# Patient Record
Sex: MALE | Race: WHITE
[De-identification: names, ages, dates, MRNs, and addresses within clinical notes are randomized per-mention and may not be internally consistent; named-entity substitution may affect disease eponyms.]

---

## 2018-01-11 ENCOUNTER — HOSPITAL ENCOUNTER (INPATIENT)
Dept: HOSPITAL 92 - ERS | Age: 78
LOS: 2 days | Discharge: HOME | DRG: 309 | End: 2018-01-13
Attending: INTERNAL MEDICINE | Admitting: INTERNAL MEDICINE
Payer: MEDICARE

## 2018-01-11 VITALS — BODY MASS INDEX: 25.9 KG/M2

## 2018-01-11 DIAGNOSIS — Z79.82: ICD-10-CM

## 2018-01-11 DIAGNOSIS — I48.2: Primary | ICD-10-CM

## 2018-01-11 DIAGNOSIS — I10: ICD-10-CM

## 2018-01-11 DIAGNOSIS — Z79.84: ICD-10-CM

## 2018-01-11 DIAGNOSIS — E11.9: ICD-10-CM

## 2018-01-11 DIAGNOSIS — Z95.1: ICD-10-CM

## 2018-01-11 DIAGNOSIS — Z87.891: ICD-10-CM

## 2018-01-11 DIAGNOSIS — N40.0: ICD-10-CM

## 2018-01-11 DIAGNOSIS — I24.8: ICD-10-CM

## 2018-01-11 LAB
ALBUMIN SERPL BCG-MCNC: 3.6 G/DL (ref 3.4–4.8)
ALP SERPL-CCNC: 72 U/L (ref 40–150)
ALT SERPL W P-5'-P-CCNC: 9 U/L (ref 8–55)
ANION GAP SERPL CALC-SCNC: 17 MMOL/L (ref 10–20)
AST SERPL-CCNC: 12 U/L (ref 5–34)
BASOPHILS # BLD AUTO: 0 THOU/UL (ref 0–0.2)
BASOPHILS NFR BLD AUTO: 0.2 % (ref 0–1)
BILIRUB SERPL-MCNC: 0.6 MG/DL (ref 0.2–1.2)
BUN SERPL-MCNC: 16 MG/DL (ref 8.4–25.7)
CALCIUM SERPL-MCNC: 9.5 MG/DL (ref 7.8–10.44)
CHLORIDE SERPL-SCNC: 104 MMOL/L (ref 98–107)
CK MB SERPL-MCNC: 1.4 NG/ML (ref 0–6.6)
CK SERPL-CCNC: 27 U/L (ref 30–200)
CO2 SERPL-SCNC: 21 MMOL/L (ref 23–31)
CREAT CL PREDICTED SERPL C-G-VRATE: 0 ML/MIN (ref 70–130)
EOSINOPHIL # BLD AUTO: 0.3 THOU/UL (ref 0–0.7)
EOSINOPHIL NFR BLD AUTO: 2.5 % (ref 0–10)
GLOBULIN SER CALC-MCNC: 3.2 G/DL (ref 2.4–3.5)
GLUCOSE SERPL-MCNC: 178 MG/DL (ref 83–110)
HGB BLD-MCNC: 14 G/DL (ref 14–18)
LYMPHOCYTES # BLD: 3.6 THOU/UL (ref 1.2–3.4)
LYMPHOCYTES NFR BLD AUTO: 33.1 % (ref 21–51)
MCH RBC QN AUTO: 29.8 PG (ref 27–31)
MCV RBC AUTO: 90.1 FL (ref 80–94)
MONOCYTES # BLD AUTO: 0.9 THOU/UL (ref 0.11–0.59)
MONOCYTES NFR BLD AUTO: 7.8 % (ref 0–10)
NEUTROPHILS # BLD AUTO: 6.2 THOU/UL (ref 1.4–6.5)
NEUTROPHILS NFR BLD AUTO: 56.4 % (ref 42–75)
PLATELET # BLD AUTO: 212 THOU/UL (ref 130–400)
POTASSIUM SERPL-SCNC: 4.3 MMOL/L (ref 3.5–5.1)
RBC # BLD AUTO: 4.69 MILL/UL (ref 4.7–6.1)
SODIUM SERPL-SCNC: 138 MMOL/L (ref 136–145)
TROPONIN I SERPL DL<=0.01 NG/ML-MCNC: 0.04 NG/ML (ref ?–0.03)
TROPONIN I SERPL DL<=0.01 NG/ML-MCNC: 0.06 NG/ML (ref ?–0.03)
WBC # BLD AUTO: 10.9 THOU/UL (ref 4.8–10.8)

## 2018-01-11 PROCEDURE — 93010 ELECTROCARDIOGRAM REPORT: CPT

## 2018-01-11 PROCEDURE — 36416 COLLJ CAPILLARY BLOOD SPEC: CPT

## 2018-01-11 PROCEDURE — 93005 ELECTROCARDIOGRAM TRACING: CPT

## 2018-01-11 PROCEDURE — 96361 HYDRATE IV INFUSION ADD-ON: CPT

## 2018-01-11 PROCEDURE — 84484 ASSAY OF TROPONIN QUANT: CPT

## 2018-01-11 PROCEDURE — 93306 TTE W/DOPPLER COMPLETE: CPT

## 2018-01-11 PROCEDURE — 84443 ASSAY THYROID STIM HORMONE: CPT

## 2018-01-11 PROCEDURE — 82553 CREATINE MB FRACTION: CPT

## 2018-01-11 PROCEDURE — 80048 BASIC METABOLIC PNL TOTAL CA: CPT

## 2018-01-11 PROCEDURE — 94760 N-INVAS EAR/PLS OXIMETRY 1: CPT

## 2018-01-11 PROCEDURE — 85025 COMPLETE CBC W/AUTO DIFF WBC: CPT

## 2018-01-11 PROCEDURE — 93880 EXTRACRANIAL BILAT STUDY: CPT

## 2018-01-11 PROCEDURE — 80053 COMPREHEN METABOLIC PANEL: CPT

## 2018-01-11 PROCEDURE — 85379 FIBRIN DEGRADATION QUANT: CPT

## 2018-01-11 PROCEDURE — 96360 HYDRATION IV INFUSION INIT: CPT

## 2018-01-11 PROCEDURE — 80061 LIPID PANEL: CPT

## 2018-01-11 PROCEDURE — 71275 CT ANGIOGRAPHY CHEST: CPT

## 2018-01-11 PROCEDURE — 36415 COLL VENOUS BLD VENIPUNCTURE: CPT

## 2018-01-11 PROCEDURE — 83880 ASSAY OF NATRIURETIC PEPTIDE: CPT

## 2018-01-11 PROCEDURE — A4216 STERILE WATER/SALINE, 10 ML: HCPCS

## 2018-01-11 NOTE — CT
CT PULMONARY ANGIOGRAM WITH IV CONTRAST AND 3D POSTPROCESSING

1/11/18

 

HISTORY: 

Chest pain. Atrial fibrillation. 

 

FINDINGS:  

There is good contrast opacification of the pulmonary arterial vasculature without filling defects to
 suggest pulmonary embolism. Postop changes or seen. No pleural or pericardial effusions are identifi
ed. There are vascular calcifications without evidence of aneurysmal dilatation of the thoracic aorta
. Chronic changes seen in the lung fields bilaterally. No lobar consolidation or pneumothoraces are n
oted. There is a 7 mm nodule in the anterior aspect of the right upper lobe. There are degenerative c
hanges in the spine. Upper abdominal tomograms demonstrates fatty infiltration of the pancreas. A 7 m
m nodule is also seen in the lingula. 

 

IMPRESSION:  

1.      No CT evidence of pulmonary embolism.

2.      Indeterminate parenchymal lung nodules. A followup CT scan of the chest is recommended in thr
ee months.

 

Code LN

 

POS: MARLENE

## 2018-01-12 LAB — TROPONIN I SERPL DL<=0.01 NG/ML-MCNC: 0.04 NG/ML (ref ?–0.03)

## 2018-01-12 RX ADMIN — INSULIN LISPRO PRN UNITS: 100 INJECTION, SOLUTION INTRAVENOUS; SUBCUTANEOUS at 17:08

## 2018-01-12 NOTE — CON-2
DATE OF CONSULTATION:  01/12/2018

 

ATTENDING:  Len Pa M.D.

 

RESIDENT:  Silvia Breen M.D., PGY-2.

 

REASON FOR CONSULTATION:  Atrial fibrillation with rapid ventricular rate.

 

HISTORY OF PRESENT ILLNESS:  The patient is a 77-year-old  male with a past history of atria
l fibrillation 2 years status post ablation and coronary artery disease, status post 3 vessel CABG wh
o presented to the Emergency Department with a chief complaint of left arm numbness and tingling.  He
 was incidentally noted to have pulse in the 140s.  After transfer to [facility], he was given asp
irin 324 mg and 1 liter of normal saline and spontaneously his rate became controlled without medicat
ions.  Earlier this morning, he was noted to have heart rate in the 130s.  He was given a bolus of di
ltiazem on the floor and subsequently started on a diltiazem drip.  At this moment, his heart rate is
 currently controlled in the 90s.  Of note, the patient had an ablation two years ago in [place].  Telma
or to this, he was on anticoagulation with warfarin.  He had a fall resulting in intracranial hemorrh
age.  Because of this, the decision was made to for patient to undergo ablation with placement of Carlos
chman device and warfarin was discontinued indefinitely shortly after that.

 

PAST MEDICAL HISTORY:

1.  Coronary artery disease.

2.  Atrial fibrillation, status post ablation.

3.  Diabetes mellitus type 2.

4.  Congestive heart failure.

 

PAST SURGICAL HISTORY:

1.  CABG x3 vessels.

2.  Left eye cataract surgery.

 

ALLERGIES:  No known drug allergies.

 

MEDICATIONS:

1.  Aspirin 81 mg.

2.  Tamsulosin 0.4 mg p.o. daily.

3.  Glipizide 10 mg p.o. daily.

4.  Metformin 1000 mg p.o. b.i.d. with meals.

5.  Carvedilol 3.125 mg p.o. b.i.d.

6.  Claritin 10 mg p.o. daily.

 

SOCIAL HISTORY:  The patient is a former tobacco user.  He states that he smoked 5 packs of cigarette
s per day for approximately 30 years.  He quit in 1988.  He denies alcohol and drug use.

 

REVIEW OF SYSTEMS:  Positive symptoms include left arm paresthesias.  Ten point review of systems oth
erwise negative.

 

PHYSICAL EXAMINATION:

VITAL SIGNS:  Temperature 97.2, pulse 93, respirations 18, pulse ox 95% on room air, blood pressure 1
44/62.

GENERAL:  The patient is alert and oriented x3, no apparent distress.  He is appropriately interactiv
e.

EYES:  Pupils are equal, round, reactive to light.  Extraocular muscles intact.

NECK:  Supple.  No lymphadenopathy.

CARDIOVASCULAR:  Irregularly irregular rate, no murmurs or gallops.  Radial and pedal pulses 2+.

RESPIRATORY:  Lungs are clear to auscultation bilaterally.  The patient is breathing with normal effo
rt.

SKIN:  Warm and dry with no cyanosis or lesions.

ABDOMEN:  Soft and nontender to palpation.

EXTREMITIES:  No clubbing, cyanosis or edema.

MUSCULOSKELETAL:  Structure and tone within normal limits.

NEUROLOGIC:  Cranial nerves II-XII intact grossly.  No focal deficits.

 

LABORATORY DATA:

1.  CBC:  White blood cell count 10.9, hemoglobin 14.0, hematocrit 42.3, platelet count 212.

2.  D-dimer 0.58.

3.  CMP:  Sodium 138, potassium 4.3, chloride 104, carbon dioxide 21, BUN 16, creatinine 1.29, glucos
e 178, calcium 9.5, total bilirubin 0.6, AST 12, ALT 9, alkaline phosphatase 72.

4.  Cardiac enzymes:  CK 27, CK-MB 1.4, troponin 0.039, 0.057, 0.039.

5.  .

6.  TSH 3.8.

7.  CTA of the chest negative for pulmonary embolism.

 

ASSESSMENT AND PLAN:  The patient is a 77-year-old male with past history of CAD and atrial fibrillat
ion, status post ablation.  The patient's heart rate is currently controlled.  Diltiazem drip is curr
ently at 5 and patient's home carvedilol has been increased from 3.125 to 6.25 twice daily.  We will 
start patient on p.o. diltiazem and titrate diltiazem drip down as tolerated.  Given that patient has
 an atrial appendage occlusive device, we will continue him on aspirin alone.  We will request record
s from [place] regarding patient's ablation procedure.

## 2018-01-12 NOTE — HP
REASON FOR ADMISSION:  Atrial fibrillation with rapid ventricular response, 
demand ischemia, left upper extremity paraesthesias.

 

HISTORY OF PRESENT ILLNESS:  The patient complained of left upper extremity 
numbness at home.  His wife checked his blood pressure to be 108/60 and his 
heart rate was very fast.  The machine could not pick it up.  They managed to 
go to [facility] ER.  He was found to be in atrial 
fibrillation with RVR at 137 beats per minute.  The patient was given Cardizem 
IV push and was transferred here for higher level of care.  On arrival, the 
patient's heart rate was 93, then went up to 140s at around 12:49 midnight and 
was given an additional 10 mg IV push of Cardizem.  Currently, his heart rate 
is in the 130s, but the patient does not complain of any palpitations.  His 
left upper extremity numbness is resolving.  No chest pain as such.  The 
patient gives history of chronic atrial fibrillation and has had prior ablation 
done 2 years ago at [facility] in [place].  He was on Coumadin before 
for atrial fibrillation, but had a syncopal episode with subdural hematoma on  and was taken off Coumadin from then on.  He does not follow with any 
cardiologist at present.  He used to follow up with Dr. Borges at [place], but 
the physician moved off and since then he has not followed up with one.  

 

PAST MEDICAL/SURGICAL HISTORY:  CABG for 3-vessel disease in  done at 
[place], [place], prior history of atrial fibrillation with ablation done 2 years 
back at [facility] in [place].  He has had cataract surgery on the left side 
done on Monday, diabetes mellitus type 2, prior history of subdural hematoma in 
2015, prior colonoscopies with no malignant lesions, benign prostatic 
hypertrophy.

 

CURRENT MEDICATIONS:  Metformin 1 gram twice daily, glipizide 10 mg daily, 
Coreg 3.125 mg twice daily, aspirin 81 mg daily, Flomax at bedtime.

 

ALLERGIES:  No known drug allergies.

 

PERSONAL HISTORY:  Currently does not smoke.  Quit smoking in , prior to 
which he had smoked 5 packs a day for nearly 30 years.  Does not abuse alcohol 
or drugs.  Lives with his wife.  On a normal day the patient ambulates within 
the house, but cannot walk beyond that due to lower extremity pain and likely 
peripheral vascular disease.

 

FAMILY HISTORY:  Mom  of breast cancer at the age of 58 years.  Father  
in his 70s in his sleep, he had a history of diabetes as well.

 

REVIEW OF SYSTEMS:  The following complete review of systems was

negative, unless otherwise mentioned in the HPI or below:

Constitutional:  Weight loss or gain, ability to conduct usual

activities.

Skin:  Rash, itching.

Eyes:  Double vision, pain.

ENT/Mouth:  Nose bleeding, neck stiffness, pain, tenderness.

Cardiovascular:  Palpitations, dyspnea on exertion, orthopnea.

Respiratory:  Shortness of breath, wheezing, cough, hemoptysis, 

fever or night sweats.

Gastrointestinal:  Poor appetite, abdominal pain, heartburn, 

nausea, vomiting, constipation, or diarrhea.

Genitourinary:  Urgency, frequency, dysuria, nocturia.

Musculoskeletal:  Pain, swelling.

Neurologic/Psychiatric:  Anxiety, depression.

Allergy/Immunologic:  Skin rash, bleeding tendency.

 

PHYSICAL EXAMINATION:

GENERAL:  The patient is a 77-year-old male who is currently not in any acute 
distress.

VITAL SIGNS:  Blood pressure 126/78, pulse 130 per minute, currently, 
respiratory rate 18 per minute, temperature 98 degrees Fahrenheit, saturating 98
% on room air.

NECK:  Supple, no elevated JVD.

HEENT:  Eyes; extraocular muscles intact.  Pupils reacting to light.  Oral 
cavity; mucous membranes are moist.  No exudates or congestion.

CARDIOVASCULAR:  S1, S2 heard.  Irregular rhythm, no murmur.

RESPIRATORY:  Air entry 1+ bilaterally.  No rales or rhonchi.

ABDOMEN:  Soft, bowel sounds heard.  No tenderness, rigidity or guarding.

EXTREMITIES:  No peripheral edema or calf tenderness.

VASCULAR:  Peripheral pulses are 1+ bilateral.  No ischemic ulcerations or 
gangrene.

CENTRAL NERVOUS SYSTEM:  No gross focal deficits seen.  Patient is alert, awake
, oriented well.

PSYCHIATRIC:  The patient's mood is euthymic.  No hallucinations or delusions.

 

LABORATORY AND X-RAY FINDINGS:  Initial EKG done at [facility] ER 
showed atrial fibrillation with RVR at 137 beats per minute.  This was at 5:41 
p.m. yesterday evening.  On arrival here, the patient had atrial fibrillation 
with RVR at 93 beats per minute at 10:09 p.m. EKG also shows Q-waves in lead II
, III, AVF.  White count 10, H&H 14 and 42, platelet count 212, MCV is 90 with 
56% neutrophils.  D-dimer is 0.58.  Serum bicarbonate 21, BUN 16, creatinine 1.2
, serum glucose 178.  Liver enzymes within normal limits.  CK-MB 1.4, troponin 
I is indeterminate peaking up to 0.05.  BNP is 431, albumin is 3.6.  TSH 3.84.  
CT angio chest done shows no PE.  There are nonspecific pulmonary lung nodules 
seen in the right upper lobe.

 

CLINICAL IMPRESSION AND PLAN:  The patient will be admitted to telemetry for 
atrial fibrillation with rapid ventricular response with demand ischemia.  He 
will be placed on Cardizem drip for now at 5 mg an hour.  Full dose aspirin.  
No anticoagulation in view of prior history of subdural hematoma with syncope.  
Dr. Pa who is on call for Cardiology will be consulted.  He will have 
echo with 2D  Doppler, a free T3, free T4 and a repeat TSH will be obtained in 
the morning.  The patient has not had a prior ultrasound of the carotids.  We 
will obtain the same in view of his history of subdural hematoma with sudden 
syncopal episode 2 years back which led to him being taken off Coumadin then.  
We will increase his Coreg to 6.25 mg twice daily.  Metformin will be held for 
now.  We will continue him on glipizide and moderate Humalog coverage for now.  
We will also continue his Flomax as before.

 

MTDD

## 2018-01-12 NOTE — CON
DATE OF CONSULTATION:  01/12/2018

 

Refer to Silvia Breen for H&P for details.

 

Mr. Dietz recently presented with atrial fibrillation with RVR.  He has a previous history of AFIB st
atus post ablation.  Based on his history, it appears he has also had a Watchman device.  He was on C
oumadin for 3 months and then was discontinued.  His wife states due to trauma with intracerebral ble
ed, it was decided to proceed with Watchman device.

 

He is currently stable on 5 mcg of diltiazem.

 

PHYSICAL EXAMINATION:

GENERAL:  Patient is a pleasant male who is in no acute distress.  The patient appears his stated age
.

VITAL SIGNS:  Blood pressure 144/62, pulse 103, temperature 97.2.

NEUROLOGIC:  The patient is alert and oriented times 3 with no focal neurologic deficits.

HEENT:  Sclerae without icterus.  Mouth has moist mucous membranes with normal pallor.

NECK:  No JVD.  Carotid upstroke brisk.  No bruits bilaterally.

LUNGS:  Clear to auscultation with unlabored respirations.

BACK:  No scoliosis or kyphosis.

CARDIAC:  Irregularly irregular.  No S3 or S4 noted.  No significant rubs, murmurs, thrills, or martinez
ps noted throughout the precordium.  PMI is not displaced.  There is no parasternal heave.

ABDOMEN:  Soft, nontender, nondistended.  No peritoneal signs present.

No hepatosplenomegaly.  No abnormal striae.

EXTREMITIES:  2+ femoral and 2+ dorsalis pedis pulses.  No cyanosis, clubbing, or edema.

SKIN:  No gross abnormalities.

 

PERTINENT LABORATORY DATA:  Hemoglobin 14, creatinine 1.29.

 

IMPRESSION:

1.  Atrial fibrillation with rapid ventricular response.

2.  Coronary artery disease.

3.  Status bypass surgery.

 

RECOMMENDATIONS:

1.  Add p.o. Cardizem at 180 mg 1 p.o. x1 dose now in addition to Coreg.

2.  Continue IV Cardizem and titrate down once his heart rate is controlled.

3.  We will try and obtain records to confirm Watchman device placement.  If that is the case, he osorio
s not need anticoagulation therapy.

## 2018-01-13 VITALS — SYSTOLIC BLOOD PRESSURE: 124 MMHG | DIASTOLIC BLOOD PRESSURE: 61 MMHG | TEMPERATURE: 98.4 F

## 2018-01-13 LAB
ANION GAP SERPL CALC-SCNC: 12 MMOL/L (ref 10–20)
BASOPHILS # BLD AUTO: 0.1 THOU/UL (ref 0–0.2)
BASOPHILS NFR BLD AUTO: 0.4 % (ref 0–1)
BUN SERPL-MCNC: 16 MG/DL (ref 8.4–25.7)
CALCIUM SERPL-MCNC: 9.4 MG/DL (ref 7.8–10.44)
CHD RISK SERPL-RTO: 3.7 (ref ?–4.5)
CHLORIDE SERPL-SCNC: 101 MMOL/L (ref 98–107)
CHOLEST SERPL-MCNC: 121 MG/DL
CO2 SERPL-SCNC: 24 MMOL/L (ref 23–31)
CREAT CL PREDICTED SERPL C-G-VRATE: 46 ML/MIN (ref 70–130)
EOSINOPHIL # BLD AUTO: 0.3 THOU/UL (ref 0–0.7)
EOSINOPHIL NFR BLD AUTO: 2.4 % (ref 0–10)
GLUCOSE SERPL-MCNC: 280 MG/DL (ref 83–110)
HDLC SERPL-MCNC: 33 MG/DL
HGB BLD-MCNC: 12.9 G/DL (ref 14–18)
LDLC SERPL CALC-MCNC: 59 MG/DL
LYMPHOCYTES # BLD: 3.2 THOU/UL (ref 1.2–3.4)
LYMPHOCYTES NFR BLD AUTO: 28.1 % (ref 21–51)
MCH RBC QN AUTO: 29.8 PG (ref 27–31)
MCV RBC AUTO: 90.7 FL (ref 80–94)
MONOCYTES # BLD AUTO: 0.8 THOU/UL (ref 0.11–0.59)
MONOCYTES NFR BLD AUTO: 7 % (ref 0–10)
NEUTROPHILS # BLD AUTO: 7 THOU/UL (ref 1.4–6.5)
NEUTROPHILS NFR BLD AUTO: 62 % (ref 42–75)
PLATELET # BLD AUTO: 205 THOU/UL (ref 130–400)
POTASSIUM SERPL-SCNC: 4.4 MMOL/L (ref 3.5–5.1)
RBC # BLD AUTO: 4.34 MILL/UL (ref 4.7–6.1)
SODIUM SERPL-SCNC: 133 MMOL/L (ref 136–145)
TRIGL SERPL-MCNC: 145 MG/DL (ref ?–150)
WBC # BLD AUTO: 11.3 THOU/UL (ref 4.8–10.8)

## 2018-01-13 RX ADMIN — INSULIN LISPRO PRN UNITS: 100 INJECTION, SOLUTION INTRAVENOUS; SUBCUTANEOUS at 12:05

## 2018-01-13 NOTE — PDOC.PN
- Subjective


Encounter Start Date: 01/13/18


Encounter Start Time: 08:40


Subjective: no chest pain or palp





- Objective


Resuscitation Status: 


 











Resuscitation Status           FULL:Full Resuscitation














MAR Reviewed: Yes


Vital Signs & Weight: 


 Vital Signs (12 hours)











  Temp Pulse Resp BP Pulse Ox


 


 01/13/18 08:30  98.1 F  71  16  126/60  94 L


 


 01/13/18 04:19      95


 


 01/13/18 04:10      96


 


 01/13/18 03:31  97.6 F  69  18  133/64  96


 


 01/13/18 00:05   70   128/58 L 








 Weight











Weight                         181 lb 8 oz














I&O: 


 











 01/12/18 01/13/18 01/14/18





 06:59 06:59 06:59


 


Intake Total 120 1806.6 


 


Output Total  200 


 


Balance 120 1606.6 











Result Diagrams: 


 01/13/18 04:53





 01/13/18 04:53


Additional Labs: 


 Accuchecks











  01/12/18 01/12/18 01/12/18





  20:35 17:00 11:14


 


POC Glucose  284 H  221 H  276 H














Phys Exam





- Physical Examination


HEENT: PERRLA, moist MMs


Neck: no JVD, supple


Respiratory: no wheezing, no rales


Cardiovascular: RRR, no significant murmur


Gastrointestinal: soft, non-tender, positive bowel sounds


Musculoskeletal: no edema, pulses present


Neurological: non-focal, moves all 4 limbs


Psychiatric: A&O x 3





Dx/Plan


(1) Afib


Code(s): I48.91 - UNSPECIFIED ATRIAL FIBRILLATION   Status: Acute   


Qualifiers: 


   Atrial fibrillation type: chronic   Qualified Code(s): I48.2 - Chronic 

atrial fibrillation   





(2) Demand ischemia of myocardium


Code(s): I24.8 - OTHER FORMS OF ACUTE ISCHEMIC HEART DISEASE   Status: Resolved

   





(3) HTN (hypertension)


Code(s): I10 - ESSENTIAL (PRIMARY) HYPERTENSION   Status: Chronic   


Qualifiers: 


   Hypertension type: essential hypertension   Qualified Code(s): I10 - 

Essential (primary) hypertension   





(4) DM type 2 (diabetes mellitus, type 2)


Status: Chronic   


Qualifiers: 


   Diabetes mellitus complication status: with unspecified complications   

Diabetes mellitus long term insulin use: without long term use   Qualified Code(

s): E11.8 - Type 2 diabetes mellitus with unspecified complications   





- Plan


afib in sinus rhythm this am


-: dc cardizem drip


-: is on oral coreg and start cardizem CD 180mg one dose now and daily


-: echo is done


-: dc plan in am, watch for bradycardia





* .








Review of Systems





- Medications/Allergies


Allergies/Adverse Reactions: 


 Allergies











Allergy/AdvReac Type Severity Reaction Status Date / Time


 


No Known Allergies Allergy   Unverified 01/12/18 01:07











Medications: 


 Current Medications





Acetaminophen (Tylenol)  650 mg PO Q4H PRN


   PRN Reason: Headache/Fever or Pain


Aspirin (Aspirin)  325 mg PO DAILY [facility]


   Last Admin: 01/13/18 08:32 Dose:  325 mg


Carvedilol (Coreg)  6.25 mg PO BID [facility]


   Last Admin: 01/13/18 08:33 Dose:  6.25 mg


Dextrose/Water (Dextrose 50%)  25 gm SLOW IVP PRN PRN


   PRN Reason: Hypoglycemia


Enoxaparin Sodium (Lovenox)  40 mg SC 0900 [facility]


   Last Admin: 01/13/18 08:33 Dose:  40 mg


Famotidine (Pepcid)  20 mg PO BID [facility]


   Last Admin: 01/13/18 08:33 Dose:  20 mg


Glipizide (Glucotrol)  10 mg PO 0730 [facility]


   Last Admin: 01/13/18 08:32 Dose:  10 mg


Glucagon (Glucagon)  1 mg IM PRN PRN


   PRN Reason: Hypoglycemia


Guaifenesin/Dextromethorphan (Robitussin Dm)  15 ml PO Q4H PRN


   PRN Reason: Cough


Dextrose/Water (D5w)  1,000 mls @ 0 mls/hr IV .Q0M PRN; As Directed


   PRN Reason: Hypoglycemia


Insulin Human Lispro (Humalog)  0 units SC .MODERATE SLIDING SC PRN


   PRN Reason: Moderate Correctional Scale


   Last Admin: 01/12/18 17:08 Dose:  4 units


Insulin Human Lispro (Humalog)  0 units SC .BEDTIME SLIDING SC PRN


   PRN Reason: Bedtime Correctional Scale


   Last Admin: 01/12/18 20:57 Dose:  3 unit


Metformin HCl (Glucophage)  1,000 mg PO BID-[facility]


Nitroglycerin (Nitrostat)  0.4 mg PO Q5MIN PRN


   PRN Reason: Chest Pain


Senna (Senokot)  2 tab PO HSPRN PRN


   PRN Reason: Constipation


Tamsulosin HCl (Flomax)  0.4 mg PO DAILY [facility]


   Last Admin: 01/13/18 08:33 Dose:  0.4 mg

## 2018-01-16 NOTE — DIS
DATE OF ADMISSION:  01/12/2018

 

DATE OF DISCHARGE:  01/13/2018

 

DISCHARGE DISPOSITION:  Home.

 

PRIMARY DISCHARGE DIAGNOSES:

1.  Atrial fibrillation with rapid ventricular response which is rate controlled.

2.  Demand ischemia secondary to above.

 

SECONDARY DISCHARGE DIAGNOSES:  Hypertension, diabetes mellitus type 2.

 

PROCEDURES DONE DURING HOSPITALIZATION:  The patient has had CT angio chest done on the day of admiss
ion, which showed no evidence of PE.  There were indeterminate parenchymal lung nodules which need a 
follow up CAT scan in 3 months.  Echo with 2D  Doppler showed an EF of 40-45%.  There was diastolic d
ysfunction.  Carotid Doppler done showed no hemodynamically significant stenosis.  H&H 12 and 39, agustina
telet count 205.  Discharge BUN and creatinine is 16 and 1.5, total cholesterol 121, triglycerides 14
5, LDL 59, HDL 33.  Troponin I was indeterminate peaking up to 0.05, CK-MB 1.4, BNP was 431.

 

DISCHARGE MEDICATIONS:  Aspirin 81 mg p.o. daily, Coreg 6.25 mg p.o. 3 times daily, glipizide 10 mg p
.o. daily, Loratadine 10 mg daily, metformin 1000 mg twice daily, Flomax 0.4 mg p.o. daily.

 

ALLERGIES:  No known drug allergies.

 

DISCHARGE PLAN:  The patient to follow up with primary care physician in 1 week and Dr. Pa as 
advised.

 

BRIEF COURSE DURING HOSPITALIZATION:  The patient initially got admitted on 01/12/2018 with complaint
s of left upper extremity numbness at home.  At home, he had his heart rate go very fast, which the rosalee jain could not pickup.  On arrival, he was found to be in atrial fibrillation with RVR at 137 beats
 per minute.  He was placed on Cardizem drip.  The patient has known history of prior atrial fibrilla
tion and had ablation done 2 years back at [facility] with likely Watchman device.  The patient soon
 converted to sinus rhythm.  He was evaluated by Dr. Pa for Cardiology.  His Coreg was increas
ed to 6.25 mg 3 times a day.  The patient needs to follow up with Dr. Pa as advised.  Prior to
 discharge, he is ambulating well and he is wanting to go home today.  The patient has prior history 
of intracranial bleed with subdural hematoma in 01/2015 and was taken off Coumadin from then on.  In 
view of this, no anticoagulation was given for his chronic atrial fibrillation.  He needs to continue
 his aspirin 81 mg daily.  The patient is hemodynamically stable and will be shortly discharged home 
and is cleared by Dr. Pa from Cardiology.  Please see a face-to-face documentation on [facility]
 for the day of discharge.

## 2018-02-10 NOTE — EKG
Test Reason : 

Blood Pressure : ***/*** mmHG

Vent. Rate : 129 BPM     Atrial Rate : 138 BPM

   P-R Int : 000 ms          QRS Dur : 090 ms

    QT Int : 312 ms       P-R-T Axes : 000 -11 107 degrees

   QTc Int : 457 ms

 

Undetermined rhythm

Inferior infarct , age undetermined

Abnormal ECG

 

Confirmed by DIA MURPHY M.D. ([id number]),  DAYLIN VELAZQUEZ ([id number]) on 2/10/2018 9:25:55 PM

 

Referred By:             Confirmed By:DIA MURPHY M.D.

## 2018-02-10 NOTE — EKG
Test Reason : REPEAT

Blood Pressure : ***/*** mmHG

Vent. Rate : 093 BPM     Atrial Rate : 340 BPM

   P-R Int : 000 ms          QRS Dur : 092 ms

    QT Int : 344 ms       P-R-T Axes : 000 -13 008 degrees

   QTc Int : 427 ms

 

Atrial fibrillation

Inferior infarct , age undetermined

Abnormal ECG

 

Confirmed by DIA MURPHY M.D. ([id number]),  DAYLIN VELAZQUEZ ([id number]) on 2/10/2018 9:25:58 PM

 

Referred By:             Confirmed By:DIA MURPHY M.D.

## 2018-02-19 ENCOUNTER — HOSPITAL ENCOUNTER (INPATIENT)
Dept: HOSPITAL 92 - 2NO | Age: 78
LOS: 2 days | Discharge: HOME | DRG: 274 | End: 2018-02-21
Attending: INTERNAL MEDICINE | Admitting: INTERNAL MEDICINE
Payer: MEDICARE

## 2018-02-19 VITALS — BODY MASS INDEX: 25.3 KG/M2

## 2018-02-19 DIAGNOSIS — I10: ICD-10-CM

## 2018-02-19 DIAGNOSIS — I25.10: ICD-10-CM

## 2018-02-19 DIAGNOSIS — I48.0: ICD-10-CM

## 2018-02-19 DIAGNOSIS — E11.9: ICD-10-CM

## 2018-02-19 DIAGNOSIS — Z79.84: ICD-10-CM

## 2018-02-19 DIAGNOSIS — Z95.1: ICD-10-CM

## 2018-02-19 DIAGNOSIS — I25.2: ICD-10-CM

## 2018-02-19 DIAGNOSIS — I08.0: ICD-10-CM

## 2018-02-19 DIAGNOSIS — I48.3: Primary | ICD-10-CM

## 2018-02-19 LAB
ANION GAP SERPL CALC-SCNC: 11 MMOL/L (ref 10–20)
BASOPHILS # BLD AUTO: 0 THOU/UL (ref 0–0.2)
BASOPHILS NFR BLD AUTO: 0.3 % (ref 0–1)
BUN SERPL-MCNC: 11 MG/DL (ref 8.4–25.7)
CALCIUM SERPL-MCNC: 10.2 MG/DL (ref 7.8–10.44)
CHLORIDE SERPL-SCNC: 99 MMOL/L (ref 98–107)
CO2 SERPL-SCNC: 30 MMOL/L (ref 23–31)
CREAT CL PREDICTED SERPL C-G-VRATE: 0 ML/MIN (ref 70–130)
EOSINOPHIL # BLD AUTO: 0.2 THOU/UL (ref 0–0.7)
EOSINOPHIL NFR BLD AUTO: 2.4 % (ref 0–10)
GLUCOSE SERPL-MCNC: 167 MG/DL (ref 83–110)
HGB BLD-MCNC: 13.5 G/DL (ref 14–18)
LYMPHOCYTES # BLD: 2.7 THOU/UL (ref 1.2–3.4)
LYMPHOCYTES NFR BLD AUTO: 37.9 % (ref 21–51)
MCH RBC QN AUTO: 29.5 PG (ref 27–31)
MCV RBC AUTO: 91.1 FL (ref 80–94)
MONOCYTES # BLD AUTO: 0.6 THOU/UL (ref 0.11–0.59)
MONOCYTES NFR BLD AUTO: 7.6 % (ref 0–10)
NEUTROPHILS # BLD AUTO: 3.8 THOU/UL (ref 1.4–6.5)
NEUTROPHILS NFR BLD AUTO: 51.8 % (ref 42–75)
PLATELET # BLD AUTO: 195 THOU/UL (ref 130–400)
POTASSIUM SERPL-SCNC: 4.1 MMOL/L (ref 3.5–5.1)
RBC # BLD AUTO: 4.6 MILL/UL (ref 4.7–6.1)
SODIUM SERPL-SCNC: 136 MMOL/L (ref 136–145)
WBC # BLD AUTO: 7.2 THOU/UL (ref 4.8–10.8)

## 2018-02-19 PROCEDURE — 36415 COLL VENOUS BLD VENIPUNCTURE: CPT

## 2018-02-19 PROCEDURE — C1769 GUIDE WIRE: HCPCS

## 2018-02-19 PROCEDURE — 36416 COLLJ CAPILLARY BLOOD SPEC: CPT

## 2018-02-19 PROCEDURE — 93312 ECHO TRANSESOPHAGEAL: CPT

## 2018-02-19 PROCEDURE — 85025 COMPLETE CBC W/AUTO DIFF WBC: CPT

## 2018-02-19 PROCEDURE — 85027 COMPLETE CBC AUTOMATED: CPT

## 2018-02-19 PROCEDURE — 85007 BL SMEAR W/DIFF WBC COUNT: CPT

## 2018-02-19 PROCEDURE — 80162 ASSAY OF DIGOXIN TOTAL: CPT

## 2018-02-19 PROCEDURE — 93005 ELECTROCARDIOGRAM TRACING: CPT

## 2018-02-19 PROCEDURE — 93010 ELECTROCARDIOGRAM REPORT: CPT

## 2018-02-19 PROCEDURE — 76942 ECHO GUIDE FOR BIOPSY: CPT

## 2018-02-19 PROCEDURE — 93623 PRGRMD STIMJ&PACG IV RX NFS: CPT

## 2018-02-19 PROCEDURE — 80048 BASIC METABOLIC PNL TOTAL CA: CPT

## 2018-02-19 PROCEDURE — 93653 COMPRE EP EVAL TX SVT: CPT

## 2018-02-19 PROCEDURE — 85610 PROTHROMBIN TIME: CPT

## 2018-02-19 PROCEDURE — C1730 CATH, EP, 19 OR FEW ELECT: HCPCS

## 2018-02-19 PROCEDURE — 85730 THROMBOPLASTIN TIME PARTIAL: CPT

## 2018-02-19 PROCEDURE — 93613 INTRACARDIAC EPHYS 3D MAPG: CPT

## 2018-02-20 LAB
ANION GAP SERPL CALC-SCNC: 10 MMOL/L (ref 10–20)
APTT PPP: 29 SEC (ref 22.9–36.1)
BUN SERPL-MCNC: 14 MG/DL (ref 8.4–25.7)
CALCIUM SERPL-MCNC: 9.3 MG/DL (ref 7.8–10.44)
CHLORIDE SERPL-SCNC: 102 MMOL/L (ref 98–107)
CO2 SERPL-SCNC: 27 MMOL/L (ref 23–31)
CREAT CL PREDICTED SERPL C-G-VRATE: 48 ML/MIN (ref 70–130)
DIGOXIN SERPL-MCNC: 0.44 NG/ML (ref 0.8–2)
GLUCOSE SERPL-MCNC: 183 MG/DL (ref 83–110)
HGB BLD-MCNC: 11.7 G/DL (ref 14–18)
INR PPP: 1.2
MCH RBC QN AUTO: 29.6 PG (ref 27–31)
MCV RBC AUTO: 91.6 FL (ref 80–94)
MDIFF COMPLETE?: YES
PLATELET # BLD AUTO: 163 THOU/UL (ref 130–400)
POTASSIUM SERPL-SCNC: 4.3 MMOL/L (ref 3.5–5.1)
PROTHROMBIN TIME: 15.1 SEC (ref 12–14.7)
RBC # BLD AUTO: 3.94 MILL/UL (ref 4.7–6.1)
SODIUM SERPL-SCNC: 135 MMOL/L (ref 136–145)
WBC # BLD AUTO: 7.9 THOU/UL (ref 4.8–10.8)

## 2018-02-20 PROCEDURE — 02583ZZ DESTRUCTION OF CONDUCTION MECHANISM, PERCUTANEOUS APPROACH: ICD-10-PCS | Performed by: INTERNAL MEDICINE

## 2018-02-20 PROCEDURE — 4A023FZ MEASUREMENT OF CARDIAC RHYTHM, PERCUTANEOUS APPROACH: ICD-10-PCS | Performed by: INTERNAL MEDICINE

## 2018-02-20 PROCEDURE — 02K83ZZ MAP CONDUCTION MECHANISM, PERCUTANEOUS APPROACH: ICD-10-PCS | Performed by: INTERNAL MEDICINE

## 2018-02-20 PROCEDURE — 4A0234Z MEASUREMENT OF CARDIAC ELECTRICAL ACTIVITY, PERCUTANEOUS APPROACH: ICD-10-PCS | Performed by: INTERNAL MEDICINE

## 2018-02-20 PROCEDURE — B24BZZ4 ULTRASONOGRAPHY OF HEART WITH AORTA, TRANSESOPHAGEAL: ICD-10-PCS | Performed by: INTERNAL MEDICINE

## 2018-02-20 NOTE — ECHO
DATE OF SERVICE:

2/20/18

 

ATTENDING PHYSICIAN:

Len Pa M.D.

 

REASON FOR PROCEDURE: 

The patient is a 77-year-old male with history of coronary artery disease, prior bypass surgery, hist
ory of was post bypass atrial fibrillation/flutter.  He actually underwent a Watchman procedure in 03
/2016, no followup JEFFRY was available. Here for JEFFRY to rule out cause for and atrial flutter ablation.


 

The risks and benefits detailed prior to the procedure.

 

PROCEDURE:  

The patient received deep sedation per Anesthesia specialist.  After adequate level of sedation achie
bryan, the standard transesophageal echocardiogram probe was passed into the esophagus without difficul
ty.  The patient tolerated procedure well. No complications noted.

 

RESULTS:

Left atrium is well visualized.  Left atrial size is about 3.5 cm which is normal.  The left atrial a
ppendage also well seen and there is adequately positioned occluder device in place.  There is no dev
ice leak documented. 

 

Four out of four pulmonary veins were well seen with normal velocities.  The interatrial septum and i
ntraventricular septum is free of defect. Mitral valve with mild central regurgitation, left ventricu
lar function is near normal, about 50% without significant wall motion abnormalities. Wall thickness 
and chamber sizes are normal otherwise as well in all four  chamber.  Tricuspid valve has trivial reg
urgitation only. Pulmonary valve is not well visualized, but appears to be normal.  Aortic valve with
 sclerotic and mildly stenotic with some adhesion of the -------- noted.  Three leaflets aortic valve
 visualized.  The pericardial space without effusion.  The visualized portion the ascending and desce
nding aorta without aneurysm or dissection. Adherent atheroma seen in the descending aortic portion.

 

CONCLUSION:

1.  No intracardiac clots.

2.  Adequate positioned Watchman device in place without device leaks.

3.  Normal left atrial size.

4.  Near normal left ventricular systolic function with LVEF of about 50%.

5.  Mild mitral valve regurgitation.

6.  Mild aortic valve stenosis is seen.

7.  Moderate adherent aortic atheroma.

 

ADDENDUM: 

The aortic valve area planimetry measures to be 1.0 cm2.

## 2018-02-20 NOTE — PRG
DATE OF SERVICE:  02/20/2018

 

SUBJECTIVE:  Mr. Dietz recently presented with atrial flutter.  He was admitted from the office.  His
 rate appears to be more stable.  He is off IV medication for rate control.

 

PHYSICAL EXAMINATION:

VITAL SIGNS:  Blood pressure 137/71, pulse 87, and temperature 97.8.

LUNGS:  Clear to auscultation.

HEART:  Irregular, irregular.

ABDOMEN:  Soft, nontender, nondistended.

EXTREMITIES:  No edema.

 

IMPRESSION:  Atrial flutter.

 

RECOMMENDATIONS:  The patient is scheduled for ablation today by Dr. Mckinney.  JEFFRY we will also be perfo
rmed.  We would continue anticoagulation therapy.  Once he has ablated, we will plan on discharge.

## 2018-02-20 NOTE — CON
DATE OF CONSULTATION:   02/20/2018

 

REFERRING PHYSICIAN:  Dr. Len Pa

 

I am seeing Mr. Dietz at our [facility] telemetry floor as an 
electrophysiology consultant.  His problems are:

1.  Persistent typical atrial flutter, history of CTI-dependent morphology.

2.  Prior history of atrial arrhythmias, post coronary artery bypass grafting 
surgery in the past.

3.  Prior history a Watchman device placement and PVAI procedure on 03/2016 by 
Dr. Marinelli.

4.  History of coronary artery disease status post coronary artery bypass 
grafting surgery in the past.

5.  History of LV dysfunction with ejection fraction 40-45% 2D echo on 01/13/
2018. 

6.  Prior history of upper extremity numbness and atrial fibrillation with 
flutter admission 01/2018.

7.  Remote history of intracranial bleed, subdural hematoma in 01/2015, no 
anticoagulation ever since the Watchman device.

 

ALLERGIES:  None noted.

 

MEDICATIONS AT HOME:  Included aspirin 81 daily, tamsulosin, Flomax, glipizide, 
loratadine, metformin, carvedilol, digoxin.

 

SUBJECTIVE:  Mr. Dietz is here, admitted from Dr. Pa's office due to 
atrial flutter with rapid rates.

 

This gentleman has been recently admitted in 01/2018 with an atrial flutter/
fibrillation.  Eventually was discharged home, not on anticoagulation, has a 
prior history of Watchman device placement.  I do not see any documentation 
though of postop JEFFRY at this time.  He does not have any new stroke-like 
symptoms, but in January, he had some arm numbness like discomfort.  He has had 
negative workup with nonocclusive carotid scan also negative CT scan were seen 
for PE.  He has no fever, chills or cough.  No stroke-like symptoms, no PND, 
orthopnea, lower extremity edema.  He has minimal palpitations and mostly 
fatigue and limited alertness.  Twelve point review of systems otherwise 
unremarkable.

 

PAST MEDICAL HISTORY:  As above; followed by Dr. Pa on a regular basis; 
prior the Watchman device placed by Dr. Marinelli in the past.

 

SOCIAL HISTORY:  Patient denies smoking, ETOH or drug use.

 

FAMILY HISTORY:  Noncontributory.

 

OBJECTIVE DATA:

VITAL SIGNS:  Blood pressure is 118/50, heart rate 84, respirations 17, 
temperature 98.2 degrees Fahrenheit.

GENERAL:  He is alert and oriented man in no apparent distress.

NECK:  Supple.  Jugular veins are not distended.

CHEST:  Coarse without crackles.

CARDIOVASCULAR:  Heart sounds are irregular.  S1, S2, variable.  No gallop is 
appreciated, 1/6 systolic ejection murmur is heard.

ABDOMEN:  Benign.  Bowel sounds are positive.

EXTREMITIES:  Lower extremities without edema, clubbing or cyanosis.  Pulses 
are adequate.

NEUROLOGIC:  Patient is nonfocal.

MUSCULOSKELETAL:  Without joint swelling or deformities.

SKIN:  Without rash.

 

DATABASE:  The EKG is reviewed revealing atrial flutter with typical morphology 
throughout hospitalization, variable ventricular rate response is noted.  
Narrow QRS is documented.

 

LABORATORY DATA:  Admission white count 7.2, now 7.9, hemoglobin 13.5 and 11.7, 
platelet count is 195 then 163.  INR is 1.2.  Sodium 135, potassium 4.3, BUN 14
, creatinine 1.49 today.

 

ASSESSMENT AND PLAN:  Mr. Dietz is a very pleasant 77-year-old man with a prior 
history of Watchman device placement in 03/2016 and he had a PVAI  procedure at 
that time.  



He is now presenting with atrial flutter, which appears to be typical for 
cavotricuspid isthmus dependence.  The flutter is difficult rate control and 
causes some significant fatigue and some worsened alertness to the patient and 
his family for the last couple of months at least.  He was actually 
hospitalized last month with the same problem now rehospitalized again.

 

I did discuss the treatment options.  It would be reasonable to proceed with an 
ablation to eliminate this atrial flutter, with relatively simple right atrial 
ablation would likely to be suffice.  Prior to that I would like to obtain a 
JEFFRY to rule out any intracardiac clots and adequate position of the Watchman 
device.  If it is negative, likely he will not need long-term anticoagulation.  
The risks and benefits of the above procedures were discussed with the patient 
and family.  They understand and are willing to proceed.  We will schedule him 
for a near date.

 

Thank you again for allowing me to participate in the care of this patient.

 

SHAHLA

## 2018-02-20 NOTE — EKG
Test Reason : 

Blood Pressure : ***/*** mmHG

Vent. Rate : 094 BPM     Atrial Rate : 250 BPM

   P-R Int : 000 ms          QRS Dur : 084 ms

    QT Int : 344 ms       P-R-T Axes : 000 002 -20 degrees

   QTc Int : 430 ms

 

Atrial flutter with variable A-V block

Possible Inferior infarct (cited on or before 11-JAN-2018)

Abnormal ECG

Confirmed by SHAMAR JETER ([id number]) on 2/20/2018 4:06:46 PM

 

Referred By:  LILIA           Confirmed By:SHAMAR JETER

## 2018-02-21 VITALS — SYSTOLIC BLOOD PRESSURE: 144 MMHG | DIASTOLIC BLOOD PRESSURE: 78 MMHG

## 2018-02-21 VITALS — TEMPERATURE: 97.7 F

## 2018-02-21 NOTE — CCLSPC
DATE OF PROCEDURE:  02/20/2018

 

ELECTROPHYSIOLOGY STUDY AND RADIOFREQUENCY ABLATION REPORT

 

REFERRING PHYSICIAN:  Len Pa M.D.

 

REASON FOR PROCEDURE:  Mr. Dietz is a 77-year-old male with prior history of coronary artery disease,
 prior bypass surgery, history of atrial fibrillation with Watchman device placement.  He is presenti
 with typical atrial flutter.

 

JEFFRY today demonstrated adequate Watchman device placement, no intracardiac clots.

 

PROCEDURE IN DETAIL:  The patient received deep sedation by the Anesthesia specialist.  The right fem
oral venous area was prepped, draped, and anesthetized using subcutaneous lidocaine and with ultrasou
nd guidance, two 8-Somali short sheaths were introduced.  Through this, a decapolar catheter was adva
nced to the right ventricle, His bundle, right atrium, and CS position eventually.  Pacing, mapping, 
and recording were performed in each location.  The basic presenting rhythm was atrial flutter with c
ycle lengths of 256 milliseconds.  Overdrive pacing in the proximal CS, yielded the shortest post-pac
ing intervals, suggestive of right atrial etiology.  A Thermo[employer] SF ST catheter was advanced to the 
right atrium and the 3D mapping of the right atrium and His bundle CSF locations were obtained.  The 
repeat pace mapping from the isthmus proved isthmus dependency.  Following that, cavotricuspid isthmu
s ablation was performed during which the atrial flutter terminated.  With proximal CS spacing, the t
ransisthmus block was demonstrated with transisthmus times reaching 190 milliseconds by the ablation 
line and about 140 milliseconds by the lateral right atrium.

 

Following that, reinduction of the atrial flutter was attempted during isuprel administration, but th
e flutter was not reinducible.  The transisthmus block was again rechecked on isuprel and ascertained
 the block again on the isuprel administration.

 

Following that, a basic EP study was performed.  The sinus node recovery time was 721 milliseconds wi
th corrected sinus node recovery time was 50 milliseconds.  VA pacing demonstrates no VA conduction. 
 The atrial extrastimuli testing showed AV ERP of 600/360 milliseconds with AVRP with no definite AV 
jump or dual AV berta physiology demonstrated.

 

At the end of the case, the _____ revealed no change in the cardiac silhouette.

 

CONCLUSION:

1.  Typical atrial flutter at presentation.

2.  Cavotricuspid isthmus ablation underneath his atrial flutter and resulted in a complete cavotricu
spid isthmus block.  No flutter was re-inducible post-ablation.

3.  Normal AV berta and sinus berta function.

 

PLAN:

1.  Routine post-ablation care.

2.  Hence JEFFRY demonstrated patent Watchman device placement.  No need for anticoagulation post-proced
ure acutely present.

 

POS: JOHN

## 2018-02-21 NOTE — DIS
DATE OF ADMISSION:  02/19/2018

 

DATE OF DISCHARGE:  02/21/2018

 

ADMITTING PHYSICIAN:  Len Pa M.D.

 

CONSULTING PHYSICIAN:  Tristan Mckinney M.D., Electrophysiology.

 

REASON FOR ADMISSION:  Atrial flutter with RVR.

 

FINAL DIAGNOSES:

1.  Atrial flutter.

2.  Persistent atrial fibrillation, status post previous pulmonary vein isolation ablation and Watchm
an device.

3.  Hypertension.

4.  History of bradycardia.

5.  Diabetes mellitus type 2.

 

PROCEDURES:

1.  2 D echocardiogram done 02/20/2018 showing EF of 58% with mild AV stenosis, mild MR, Watchman dev
ice in place and a moderate adherent aortic atheroma noted.

2.  Atrial flutter ablation done 02/20/2018.

3.  Typical atrial flutter ablation done on 02/20/2018 without complications.

 

DISCHARGE MEDICATIONS:

1.  Flomax 0.4 mg daily.

2.  Claritin 10 mg daily.

3.  Coreg 12.5 mg b.i.d.

4.  Aspirin 81 mg daily.

5.  Metformin 1000 mg twice daily.

6.  Glipizide 10 mg once daily.

 

HOSPITAL COURSE:  Mr. Dietz is a pleasant 77-year-old male with past medical history of persistent at
rial fibrillation, status post previous PVI and Watchman device placement in 2016 by Dr. Marinelli.  He 
had a recent admission for atrial fibrillation in 01/2018 and was discharged on rate control.  He has
 a history of a Watchman device and no oral anticoagulation was needed.  He re-presented office on 
ida, 02/16/2018, for a hospital followup.  He was in atrial flutter with RVR.  His beta blockers wer
e increased and digoxin was added to his medication list.  He then returned on Monday and was found t
o be in persistent atrial flutter with RVR.  He was admitted for rate control and ablation.  This was
 done by Dr. Mckinney on 02/20/2018 without complications.  Of note, the patient gave prior history of br
adycardia on a previous ER visit.  He told me his heart rate was occasionally in the 40s.  There is n
o EGM documentation of this anywhere that I can find.  While monitored in the hospital post-ablation,
 he did have 1 pause at 3.1 seconds, but this is while he was sleeping.  No other bradycardia was not
ed.

 

He was discharged in stable cardiac status without any complaints.  A 30-day event monitor sent to th
e patient to monitor for any further bradycardia and rule out any possible tachybrady syndrome.  The 
patient and his wife were present at the time of discharge understand and agreed to the plan of care.

## 2018-02-21 NOTE — EKG
Test Reason : 

Blood Pressure : ***/*** mmHG

Vent. Rate : 091 BPM     Atrial Rate : 091 BPM

   P-R Int : 200 ms          QRS Dur : 092 ms

    QT Int : 342 ms       P-R-T Axes : 044 -08 -11 degrees

   QTc Int : 420 ms

 

Normal sinus rhythm

Possible Inferior infarct (cited on or before 11-JAN-2018)

Abnormal ECG

Confirmed by SHAMAR JETER ([id number]) on 2/21/2018 3:36:37 PM

 

Referred By:  DALTON           Confirmed By:SHAMAR JETER

## 2018-04-26 ENCOUNTER — HOSPITAL ENCOUNTER (OUTPATIENT)
Dept: HOSPITAL 92 - CT | Age: 78
Discharge: HOME | End: 2018-04-26
Attending: FAMILY MEDICINE
Payer: MEDICARE

## 2018-04-26 DIAGNOSIS — I48.91: ICD-10-CM

## 2018-04-26 DIAGNOSIS — R91.8: Primary | ICD-10-CM

## 2018-04-26 LAB — ESTIMATED GFR-MDRD - POC: 53

## 2018-04-26 PROCEDURE — 82565 ASSAY OF CREATININE: CPT

## 2018-04-26 PROCEDURE — 71275 CT ANGIOGRAPHY CHEST: CPT

## 2019-06-15 NOTE — ULT
CAROTID ULTRASOUND WITH GRAY SCALE AND DOPPLER DUPLEX COLOR FLOW IMAGING

SPECTRAL ANALYSIS PERFORMED:

 

CLINICAL INDICATION:

History of syncope and stenosis.

 

FINDINGS:

There is scattered mild to moderate atherosclerotic calcification of the carotid arteries with areas 
of calcific shadowing.

 

PEAK SYSTOLIC VELOCITY (CM/S):

Right CCA          92

Left CCA          113

Right ICA          57

Left ICA           79

 

There is antegrade bilateral flow within the visualized bilateral vertebral arteries.

 

IMPRESSION:

1.  No hemodynamically significant stenosis of the right internal carotid artery.

 

2.  No hemodynamically significant stenosis of the left internal carotid artery.

 

POS: MARLENE Patient states no history